# Patient Record
Sex: MALE | Race: WHITE | Employment: FULL TIME | URBAN - METROPOLITAN AREA
[De-identification: names, ages, dates, MRNs, and addresses within clinical notes are randomized per-mention and may not be internally consistent; named-entity substitution may affect disease eponyms.]

---

## 2018-09-25 ENCOUNTER — HOSPITAL ENCOUNTER (OUTPATIENT)
Dept: GENERAL RADIOLOGY | Facility: HOSPITAL | Age: 33
Discharge: HOME | End: 2018-09-25
Attending: PREVENTIVE MEDICINE
Payer: OTHER MISCELLANEOUS

## 2018-09-25 ENCOUNTER — OFFICE VISIT (OUTPATIENT)
Dept: OTHER | Facility: HOSPITAL | Age: 33
End: 2018-09-25
Attending: PREVENTIVE MEDICINE
Payer: OTHER MISCELLANEOUS

## 2018-09-25 DIAGNOSIS — S39.012A LUMBAR STRAIN, INITIAL ENCOUNTER: Primary | ICD-10-CM

## 2018-09-25 PROCEDURE — 72110 X-RAY EXAM L-2 SPINE 4/>VWS: CPT | Performed by: PREVENTIVE MEDICINE

## 2018-09-25 RX ORDER — CYCLOBENZAPRINE HCL 10 MG
TABLET ORAL
Qty: 14 TABLET | Refills: 0 | Status: SHIPPED | OUTPATIENT
Start: 2018-09-25 | End: 2019-06-19 | Stop reason: ALTCHOICE

## 2018-09-25 RX ORDER — HYDROCODONE BITARTRATE AND ACETAMINOPHEN 5; 325 MG/1; MG/1
TABLET ORAL
Qty: 20 TABLET | Refills: 0 | Status: SHIPPED | OUTPATIENT
Start: 2018-09-25 | End: 2019-06-19 | Stop reason: ALTCHOICE

## 2018-09-25 NOTE — PATIENT INSTRUCTIONS
Ice to back on and off for 48 hrs, then switch to heat    Frequent position changes    Norco # 20  1-2 every 6 hrs for pain as needed    Ibuprofen ( OTC)  600 mg every 6 hrs as needed for pain.   Taker with food    Flexeril 10 mg  Take as directed      Back · You can start with ice, then switch to heat. Heat from a hot shower, hot bath, or heating pad reduces pain and works well for muscle spasms.  Put heat on the painful area for 20 minutes, then remove for 20 minutes. Do this for 60 to 90 minutes, or several Call 911 if any of the following occur:  · Trouble breathing  · Confused  · Very drowsy or trouble awakening  · Fainting or loss of consciousness  · Rapid or very slow heart rate  · Loss of bowel or bladder control  When to seek medical advice  Call your h · Don't sit for long periods. Try not to take long car rides or take other trips that have you sitting for a long time. This puts more stress on the lower back than standing or walking.   · During the first 24 to 72 hours after an injury or flare-up, apply Follow-up care  Follow up with your healthcare provider, or as advised. You may need physical therapy or more tests if your symptoms get worse. If you had X-rays your healthcare provider may be checking for any broken bones, breaks, or fractures.  Bruises Thankfully, most people feel better in 1 to 2 weeks, and most of the rest in 1 to 2 months. Most people can remain active.  Unless you had a forceful or traumatic physical injury such as a car accident or fall, X-rays may not be ordered for the first evalua Talk to your healthcare provider before using medicines, especially if you have other health problems or are taking other medicines. · You may use acetaminophen or ibuprofen to control pain, unless another pain medicine was prescribed.  If you have chronic © 4111-3437 [employer] [address] [facility] [facility], [facility] [place]. All rights reserved. This information is not intended as a substitute for professional medical care. Always follow your healthcare professional's instructions.         Xenia Umaña This medicine may interact with the following medications:  · alcohol  · antiviral medicines for HIV or AIDS  · atropine  · antihistamines for allergy, cough and cold  · certain antibiotics like erythromycin, clarithromycin  · certain medicines for anxiety This medicine may cause accidental overdose and death if it taken by other adults, children, or pets. Mix any unused medicine with a substance like cat litter or coffee grounds.  Then throw the medicine away in a sealed container like a sealed bag or a coff There are different types of narcotic medicines (opiates). If you take more than one type at the same time or if you are taking another medicine that also causes drowsiness, you may have more side effects.  Give your health care provider a list of all medic ACETAMINOPHEN; HYDROCODONE (a set a GHISLAINE long fen; tello droe KOE done) is a pain reliever. It is used to treat moderate to severe pain. How should I use this medicine? Take this medicine by mouth with a glass of water.  Follow the directions on the prescript · certain medicines for fungal infections like ketoconazole and itraconazole  · certain medicines for Parkinson's disease like benztropine, trihexyphenidyl  · certain medicines for seizures like carbamazepine, phenobarbital, phenytoin, primidone  · certain · if you often drink alcohol  · kidney disease or problems going to the bathroom  · liver disease  · lung disease, asthma, or breathing problems  · an unusual or allergic reaction to acetaminophen, hydrocodone, other opioid analgesics, other medicines, jenni If you take too much acetaminophen get medical help right away. Too much acetaminophen can be very dangerous and cause liver damage. Even if you do not have symptoms, it is important to get help right away. You may get drowsy or dizzy.  Do not drive, use m Talk to your pediatrician regarding the use of this medicine in children. Special care may be needed. What side effects may I notice from receiving this medicine?   Side effects that you should report to your doctor or health care professional as soon as p · medicines that relax muscles for surgery  · other medicines with acetaminophen  · other narcotic medicines for pain or cough  · phenothiazines like chlorpromazine, mesoridazine, prochlorperazine, thioridazine  · rifampin  What if I miss a dose?   If you m Tell your doctor or health care professional if your pain does not go away, if it gets worse, or if you have new or a different type of pain. You may develop tolerance to the medicine.  Tolerance means that you will need a higher dose of the medicine for pa You may get drowsy or dizzy. Do not drive, use machinery, or do anything that needs mental alertness until you know how this medicine affects you. Do not stand or sit up quickly, especially if you are an older patient.  This reduces the risk of dizzy or philomena

## 2018-09-27 ENCOUNTER — APPOINTMENT (OUTPATIENT)
Dept: OTHER | Facility: HOSPITAL | Age: 33
End: 2018-09-27
Attending: PREVENTIVE MEDICINE
Payer: OTHER MISCELLANEOUS

## 2018-10-01 ENCOUNTER — APPOINTMENT (OUTPATIENT)
Dept: OTHER | Facility: HOSPITAL | Age: 33
End: 2018-10-01
Attending: PREVENTIVE MEDICINE
Payer: OTHER MISCELLANEOUS

## 2018-10-08 ENCOUNTER — APPOINTMENT (OUTPATIENT)
Dept: OTHER | Facility: HOSPITAL | Age: 33
End: 2018-10-08
Attending: PREVENTIVE MEDICINE
Payer: OTHER MISCELLANEOUS

## 2018-11-02 PROBLEM — M54.50 ACUTE BILATERAL LOW BACK PAIN WITHOUT SCIATICA: Status: ACTIVE | Noted: 2018-11-02

## 2019-06-26 ENCOUNTER — OFFICE VISIT (OUTPATIENT)
Dept: OTHER | Facility: HOSPITAL | Age: 34
End: 2019-06-26
Attending: PREVENTIVE MEDICINE
Payer: OTHER MISCELLANEOUS

## 2019-06-26 ENCOUNTER — HOSPITAL ENCOUNTER (OUTPATIENT)
Dept: GENERAL RADIOLOGY | Facility: HOSPITAL | Age: 34
Discharge: HOME | End: 2019-06-26
Attending: PREVENTIVE MEDICINE
Payer: OTHER MISCELLANEOUS

## 2019-06-26 DIAGNOSIS — S67.190A CRUSHING INJURY OF RIGHT INDEX FINGER, INITIAL ENCOUNTER: Primary | ICD-10-CM

## 2019-06-26 PROCEDURE — 73140 X-RAY EXAM OF FINGER(S): CPT | Performed by: PREVENTIVE MEDICINE

## 2019-06-27 ENCOUNTER — APPOINTMENT (OUTPATIENT)
Dept: OTHER | Facility: HOSPITAL | Age: 34
End: 2019-06-27
Attending: PREVENTIVE MEDICINE
Payer: OTHER MISCELLANEOUS

## 2019-07-03 ENCOUNTER — APPOINTMENT (OUTPATIENT)
Dept: OTHER | Facility: HOSPITAL | Age: 34
End: 2019-07-03
Attending: PREVENTIVE MEDICINE
Payer: OTHER MISCELLANEOUS

## 2019-12-24 PROBLEM — S83.281A OTHER TEAR OF LATERAL MENISCUS OF RIGHT KNEE AS CURRENT INJURY, INITIAL ENCOUNTER: Status: ACTIVE | Noted: 2019-12-24

## 2019-12-24 PROBLEM — M23.006 CYST OF MENISCUS OF RIGHT KNEE: Status: ACTIVE | Noted: 2019-12-24

## 2020-01-13 ENCOUNTER — OFFICE VISIT (OUTPATIENT)
Dept: OTHER | Facility: HOSPITAL | Age: 35
End: 2020-01-13
Attending: PREVENTIVE MEDICINE

## 2020-01-13 ENCOUNTER — HOSPITAL ENCOUNTER (OUTPATIENT)
Dept: GENERAL RADIOLOGY | Facility: HOSPITAL | Age: 35
Discharge: HOME | End: 2020-01-13
Attending: PREVENTIVE MEDICINE

## 2020-01-13 DIAGNOSIS — M25.531 RIGHT WRIST PAIN: Primary | ICD-10-CM

## 2020-01-13 PROCEDURE — 73110 X-RAY EXAM OF WRIST: CPT | Performed by: PREVENTIVE MEDICINE

## 2020-01-16 ENCOUNTER — APPOINTMENT (OUTPATIENT)
Dept: OTHER | Facility: HOSPITAL | Age: 35
End: 2020-01-16
Attending: PREVENTIVE MEDICINE

## 2020-02-25 PROBLEM — S83.281D OTHER TEAR OF LATERAL MENISCUS OF RIGHT KNEE AS CURRENT INJURY, SUBSEQUENT ENCOUNTER: Status: ACTIVE | Noted: 2019-12-24

## 2020-03-03 PROBLEM — Z47.89 ORTHOPEDIC AFTERCARE: Status: ACTIVE | Noted: 2020-03-03

## 2020-04-03 PROBLEM — Z98.890 STATUS POST ARTHROSCOPIC SURGERY OF RIGHT KNEE: Status: ACTIVE | Noted: 2020-04-03

## 2020-09-30 PROBLEM — M65.9 SYNOVITIS OF ELBOW: Status: ACTIVE | Noted: 2020-09-30

## 2020-09-30 PROBLEM — M23.91 INTERNAL DERANGEMENT OF KNEE, RIGHT: Status: ACTIVE | Noted: 2020-09-30

## 2020-10-16 ENCOUNTER — APPOINTMENT (OUTPATIENT)
Dept: GENERAL RADIOLOGY | Facility: HOSPITAL | Age: 35
End: 2020-10-16
Attending: EMERGENCY MEDICINE
Payer: OTHER MISCELLANEOUS

## 2020-10-16 ENCOUNTER — HOSPITAL ENCOUNTER (EMERGENCY)
Facility: HOSPITAL | Age: 35
Discharge: HOME | End: 2020-10-16
Attending: EMERGENCY MEDICINE
Payer: OTHER MISCELLANEOUS

## 2020-10-16 VITALS
OXYGEN SATURATION: 98 % | TEMPERATURE: 99 F | BODY MASS INDEX: 30 KG/M2 | HEART RATE: 66 BPM | RESPIRATION RATE: 18 BRPM | SYSTOLIC BLOOD PRESSURE: 142 MMHG | DIASTOLIC BLOOD PRESSURE: 98 MMHG | WEIGHT: 210 LBS

## 2020-10-16 DIAGNOSIS — S16.1XXA STRAIN OF NECK MUSCLE, INITIAL ENCOUNTER: Primary | ICD-10-CM

## 2020-10-16 PROCEDURE — 99283 EMERGENCY DEPT VISIT LOW MDM: CPT

## 2020-10-16 PROCEDURE — 72040 X-RAY EXAM NECK SPINE 2-3 VW: CPT | Performed by: EMERGENCY MEDICINE

## 2020-10-16 RX ORDER — CYCLOBENZAPRINE HCL 10 MG
10 TABLET ORAL 3 TIMES DAILY PRN
Qty: 15 TABLET | Refills: 0 | Status: SHIPPED | OUTPATIENT
Start: 2020-10-16

## 2020-10-16 NOTE — ED INITIAL ASSESSMENT (HPI)
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

## 2020-10-16 NOTE — ED PROVIDER NOTES
Patient Seen in: [facility] Emergency Department      History   Patient presents with:  Trauma    Stated Complaint: trauma     HPI    31-year-old male presents with a work injury. Occurred about 30 minutes ago.   He states he was working on a constr HEENT: Head atraumatic. Sclerae anicteric. Conjunctivae show no pallor. Oropharynx clear, mucous membranes moist   Neck: C-collar in place. There is tenderness in the midline over the C2-C3 region.   Lungs: good air exchange and clear   Heart: regular for Muscle spasms.   Qty: 15 tablet Refills: 0

## 2020-10-19 ENCOUNTER — APPOINTMENT (OUTPATIENT)
Dept: OTHER | Facility: HOSPITAL | Age: 35
End: 2020-10-19
Attending: PREVENTIVE MEDICINE

## 2020-10-23 ENCOUNTER — APPOINTMENT (OUTPATIENT)
Dept: OTHER | Facility: HOSPITAL | Age: 35
End: 2020-10-23
Attending: PREVENTIVE MEDICINE

## 2020-12-22 PROBLEM — S83.281D TEAR OF LATERAL MENISCUS OF RIGHT KNEE, CURRENT, UNSPECIFIED TEAR TYPE, SUBSEQUENT ENCOUNTER: Status: ACTIVE | Noted: 2020-12-22

## 2021-05-14 PROBLEM — G56.22 LESION OF LEFT ULNAR NERVE: Status: ACTIVE | Noted: 2021-05-14

## 2021-07-03 PROBLEM — G43.009 MIGRAINE WITHOUT AURA AND WITHOUT STATUS MIGRAINOSUS, NOT INTRACTABLE: Status: ACTIVE | Noted: 2021-07-03

## 2021-11-10 PROBLEM — M54.2 NECK PAIN ON LEFT SIDE: Status: ACTIVE | Noted: 2021-11-10

## (undated) NOTE — LETTER
Date & Time: 10/16/2020, 5:19 AM  Patient: Anne Rubalcava  Encounter Provider(s):    Steve Mandujano MD       To Whom It May Concern:    Olena Lozada was seen and treated in our department on 10/16/2020.  He should not return to work until October 19